# Patient Record
Sex: MALE
[De-identification: names, ages, dates, MRNs, and addresses within clinical notes are randomized per-mention and may not be internally consistent; named-entity substitution may affect disease eponyms.]

---

## 2019-06-17 ENCOUNTER — HOSPITAL ENCOUNTER (INPATIENT)
Dept: HOSPITAL 92 - ERS | Age: 29
LOS: 2 days | Discharge: HOME | DRG: 872 | End: 2019-06-19
Attending: INTERNAL MEDICINE | Admitting: INTERNAL MEDICINE
Payer: SELF-PAY

## 2019-06-17 VITALS — BODY MASS INDEX: 26.3 KG/M2

## 2019-06-17 DIAGNOSIS — E87.2: ICD-10-CM

## 2019-06-17 DIAGNOSIS — A41.50: Primary | ICD-10-CM

## 2019-06-17 DIAGNOSIS — A09: ICD-10-CM

## 2019-06-17 LAB
ALBUMIN SERPL BCG-MCNC: 4.9 G/DL (ref 3.5–5)
ALP SERPL-CCNC: 97 U/L (ref 40–150)
ALT SERPL W P-5'-P-CCNC: 28 U/L (ref 8–55)
ANION GAP SERPL CALC-SCNC: 17 MMOL/L (ref 10–20)
AST SERPL-CCNC: 18 U/L (ref 5–34)
BILIRUB SERPL-MCNC: 0.6 MG/DL (ref 0.2–1.2)
BUN SERPL-MCNC: 12 MG/DL (ref 8.9–20.6)
CALCIUM SERPL-MCNC: 9.9 MG/DL (ref 7.8–10.44)
CHLORIDE SERPL-SCNC: 100 MMOL/L (ref 98–107)
CO2 SERPL-SCNC: 20 MMOL/L (ref 22–29)
CREAT CL PREDICTED SERPL C-G-VRATE: 0 ML/MIN (ref 70–130)
GLOBULIN SER CALC-MCNC: 3.3 G/DL (ref 2.4–3.5)
GLUCOSE SERPL-MCNC: 104 MG/DL (ref 70–105)
HGB BLD-MCNC: 16.8 G/DL (ref 14–18)
MCH RBC QN AUTO: 30.4 PG (ref 27–31)
MCV RBC AUTO: 89.7 FL (ref 78–98)
MDIFF COMPLETE?: YES
PLATELET # BLD AUTO: 266 THOU/UL (ref 130–400)
POLYCHROMASIA BLD QL SMEAR: (no result) (100X)
POTASSIUM SERPL-SCNC: 3.5 MMOL/L (ref 3.5–5.1)
RBC # BLD AUTO: 5.54 MILL/UL (ref 4.7–6.1)
SODIUM SERPL-SCNC: 133 MMOL/L (ref 136–145)
SP GR UR STRIP: 1.05 (ref 1–1.04)
WBC # BLD AUTO: 19.2 THOU/UL (ref 4.8–10.8)

## 2019-06-17 PROCEDURE — 87804 INFLUENZA ASSAY W/OPTIC: CPT

## 2019-06-17 PROCEDURE — 87449 NOS EACH ORGANISM AG IA: CPT

## 2019-06-17 PROCEDURE — 87324 CLOSTRIDIUM AG IA: CPT

## 2019-06-17 PROCEDURE — 87086 URINE CULTURE/COLONY COUNT: CPT

## 2019-06-17 PROCEDURE — 93005 ELECTROCARDIOGRAM TRACING: CPT

## 2019-06-17 PROCEDURE — 94760 N-INVAS EAR/PLS OXIMETRY 1: CPT

## 2019-06-17 PROCEDURE — 85025 COMPLETE CBC W/AUTO DIFF WBC: CPT

## 2019-06-17 PROCEDURE — C9113 INJ PANTOPRAZOLE SODIUM, VIA: HCPCS

## 2019-06-17 PROCEDURE — 87040 BLOOD CULTURE FOR BACTERIA: CPT

## 2019-06-17 PROCEDURE — 80053 COMPREHEN METABOLIC PANEL: CPT

## 2019-06-17 PROCEDURE — 87149 DNA/RNA DIRECT PROBE: CPT

## 2019-06-17 PROCEDURE — 83605 ASSAY OF LACTIC ACID: CPT

## 2019-06-17 PROCEDURE — 71045 X-RAY EXAM CHEST 1 VIEW: CPT

## 2019-06-17 PROCEDURE — 36415 COLL VENOUS BLD VENIPUNCTURE: CPT

## 2019-06-17 PROCEDURE — 74177 CT ABD & PELVIS W/CONTRAST: CPT

## 2019-06-17 PROCEDURE — 81003 URINALYSIS AUTO W/O SCOPE: CPT

## 2019-06-17 RX ADMIN — METRONIDAZOLE SCH MLS: 500 INJECTION, SOLUTION INTRAVENOUS at 21:08

## 2019-06-17 NOTE — RAD
CHEST 1 VIEW:

 

HISTORY: 

Sepsis alert.

 

COMPARISON: 

None.

 

FINDINGS: 

There is hazy opacity of left lung base.  No pneumothorax.  Right lung is clear.  Cardiac silhouette 
and mediastinal contours are within normal limits.  No acute osseous abnormality.

 

IMPRESSION: 

Hazy opacity left lung base concerning for infection.  Followup after treatment recommended.

 

POS: [facility]

## 2019-06-17 NOTE — HP
PRIMARY CARE PHYSICIAN:  The patient currently does not have a primary care

physician. 



CHIEF COMPLAINT:  Abdominal pain.



HISTORY OF PRESENT ILLNESS:  Mr. Nathaniel Fernando is a very pleasant 28-year-old

gentleman who has no known past medical history.  He says last night he had eaten a

hamburger from [employer], and about 2 hours later he started having some sharp

pain in his stomach.  He then started having vomiting as well as diarrhea off and

on.  He had multiple episodes of vomiting and diarrhea, i.e., but no hematemesis, no

blood in the stools.  He did have a high fever according to his girlfriend and the

pain was very severe.  It was sharp in the epigastric area, and at the worst, he

rated it about 10/10.  He has also been having some headache off and on and he says

that he has been having aches in his knees and in his legs off and on.  As a result

of him feeling really bad, he came to the emergency room out of the insistence of

his girlfriend where he was evaluated.  A chest x-ray revealed findings that were

suspicious for possible haziness in the left lower lobe and he also had an elevated

lactic acid as well as elevated white blood cell count, and for this reason, he is

being admitted. 



REVIEW OF SYSTEMS:  CONSTITUTIONAL:  He has had subjective fever, but no chills.  No

night sweats.  No weight loss. 

HEENT:  He has had some headache, but no visual changes.  He has had some mild sore

throat and feeling a bit stopped up.  No rhinorrhea.  No neck pain.  No adenopathy. 

PULMONARY:  He has had cough off and on, but no wheezing.  He has had some mild

dyspnea on exertion. 

CARDIOVASCULAR:  He denies any chest pain.  No shortness of breath.  No PND.  No

orthopnea. 

GASTROINTESTINAL:  Is as in history of present illness.   

GENITOURINARY:  No urinary frequency, hematuria, or hesitancy. NEUROLOGIC:  No focal

weakness or numbness.  No seizures.  PSYCHIATRIC:  No symptoms of anxiety or

depression. 

SKIN AND INTEGUMENT:  No skin changes.  No rash.



PAST MEDICAL HISTORY:  Negative.



PAST SURGICAL HISTORY:  Also negative.



ALLERGIES:  NO KNOWN DRUG ALLERGIES.



SOCIAL HISTORY:  He occasionally drinks once a week socially.  Denies any tobacco

use.  He is single. 



FAMILY HISTORY:  No known history of any medical conditions or heritable diseases.



MEDICATIONS:  None.



PHYSICAL EXAMINATION:

GENERAL:  He is alert and oriented.  He appears to be in no acute distress.  He is

well developed and well nourished. 

VITAL SIGNS:  Blood pressure was 111/65, heart rate 100, respiratory rate of 18,

temperature is 98.8. 

HEENT:  His pupils are equal, round, and reactive to light and accommodation.

Extraocular muscles are intact.  His sclerae anicteric.  Throat; there is no

erythema, no exudates. 

NECK:  No adenopathy.  No bruits. 

LUNGS:  Clear to auscultation.  There is no wheezing, no rales, no rhonchi. 

CARDIOVASCULAR:  He has a normal S1 and S2.  There is no S3 or S4.  No murmurs or

clicks.  No rubs. 

ABDOMEN:  Soft.  He has some diffuse abdominal pain.  There is no rebound, no

guarding, no organomegaly. 

EXTREMITIES:  There is no clubbing or cyanosis.  No edema.  There is no calf

tenderness.  No joint effusions. 

NEUROLOGIC:  Cranial nerves II through XII are grossly intact.  Muscle strength is

5/5 in both his upper and lower extremities. 

SKIN AND INTEGUMENT:  There are no skin changes and no rash.



LAB RESULTS:  The white blood cell count is 19.2, hemoglobin 16.8, hematocrit is

49.7, and platelet count is 226.  Sodium 133, potassium 3.5, chloride is 100, CO2 is

20, BUN of 12, creatinine 1.25, glucose is 104.  Lactic acid 2.5.  Urinalysis was

negative.  On his chest x-ray by my reading, there was some haziness in the left

lower lobe, but it appears to be more of a shadow than it does to be an actual

infiltrate.  There are no effusions and heart size is normal.  He had a CT scan of

the abdomen which was essentially negative. 



ASSESSMENT:  This is a pleasant 28-year-old gentleman who is admitted to the

hospital with abdominal pain, nausea, and vomiting.  I suspect this is likely the

result of gastroenteritis probably and presumed infectious.  He will be admitted due

to the white count and elevated lactic acid, started on IV empiric antibiotics.  We

will follow up with the blood culture results and IV fluids for hydration.  I

suspect that if he is improved by tomorrow and his white blood count is better that

he likely can be discharged home, may be on a short course of oral quinolone and

Flagyl. 



Pneumonia.  I believe this is unlikely and has essentially been ruled out clinically.







Job ID:  [id number]

## 2019-06-17 NOTE — CT
CT ABDOMEN AND PELVIS WITH IV CONTRAST:

6/17/19

 

Multiple axial tomograms obtained through the abdomen and pelvis with IV enhancement. 

 

INDICATIONS:

Abdominal pain. Nausea, vomiting and diarrhea. 

 

FINDINGS: 

Lung bases clear. 

 

Liver, spleen and pancreas unremarkable. 

 

Adrenal glands and kidneys unremarkable. 

 

Small bowel loops appear normal caliber. Appendix appears normal. Colon unremarkable. 

 

IMPRESSION: 

No acute abnormality identified. 

 

POS: OFF

## 2019-06-18 LAB
BASOPHILS # BLD AUTO: 0 THOU/UL (ref 0–0.2)
BASOPHILS NFR BLD AUTO: 0.2 % (ref 0–1)
EOSINOPHIL # BLD AUTO: 0.1 THOU/UL (ref 0–0.7)
EOSINOPHIL NFR BLD AUTO: 0.7 % (ref 0–10)
HGB BLD-MCNC: 14 G/DL (ref 14–18)
LYMPHOCYTES # BLD: 1.2 THOU/UL (ref 1.2–3.4)
LYMPHOCYTES NFR BLD AUTO: 10.6 % (ref 21–51)
MCH RBC QN AUTO: 30.5 PG (ref 27–31)
MCV RBC AUTO: 92.5 FL (ref 78–98)
MONOCYTES # BLD AUTO: 0.7 THOU/UL (ref 0.11–0.59)
MONOCYTES NFR BLD AUTO: 6.5 % (ref 0–10)
NEUTROPHILS # BLD AUTO: 9.1 THOU/UL (ref 1.4–6.5)
NEUTROPHILS NFR BLD AUTO: 82.1 % (ref 42–75)
PLATELET # BLD AUTO: 201 THOU/UL (ref 130–400)
RBC # BLD AUTO: 4.58 MILL/UL (ref 4.7–6.1)
WBC # BLD AUTO: 11.1 THOU/UL (ref 4.8–10.8)

## 2019-06-18 RX ADMIN — METRONIDAZOLE SCH MLS: 500 INJECTION, SOLUTION INTRAVENOUS at 14:03

## 2019-06-18 RX ADMIN — METRONIDAZOLE SCH MLS: 500 INJECTION, SOLUTION INTRAVENOUS at 05:08

## 2019-06-18 RX ADMIN — METRONIDAZOLE SCH MLS: 500 INJECTION, SOLUTION INTRAVENOUS at 21:13

## 2019-06-18 NOTE — PDOC.PN
- Subjective


Encounter Start Date: 06/18/19


Encounter Start Time: 10:20


-: old records requested/rev





pt has diarrhoea and abdominal cramps, no vomiting, no fever


Patient seen and examined. No new complaints. No overnight events





- Objective


Resuscitation Status - Order Detail:





06/17/19 17:34


Resuscitation Status Routine 


   Resuscitation Status: FULL: Full Resuscitation








MAR Reviewed: Yes


Vital Signs & Weight: 


 Vital Signs (12 hours)











  Temp Pulse Resp BP BP Pulse Ox


 


 06/18/19 08:00  97.6 F  73  16  110/69   96


 


 06/18/19 04:00  97.6 F  59 L  16  107/66   97


 


 06/18/19 00:00  97.5 F L  65  16   117/69  97








 Weight











Weight                         173 lb














I&O: 


 











 06/17/19 06/18/19 06/19/19





 06:59 06:59 06:59


 


Intake Total  2064 


 


Balance  2064 











Result Diagrams: 


 06/18/19 04:09





 06/17/19 11:11


Radiology Reviewed by me: Yes





Phys Exam





- Physical Examination


Constitutional: NAD


HEENT: PERRLA, moist MMs, sclera anicteric


Neck: no JVD, supple


Respiratory: no wheezing, no rales, no rhonchi


Cardiovascular: RRR, no significant murmur, no rub


Gastrointestinal: soft, non-tender, no distention, positive bowel sounds


Musculoskeletal: no edema, pulses present


Neurological: non-focal, normal sensation, moves all 4 limbs


Lymphatic: no nodes


Psychiatric: normal affect, A&O x 3


Skin: no rash, normal turgor, cap refill <2 seconds





Dx/Plan


(1) Gastroenteritis, infectious, presumed


Code(s): K52.9 - NONINFECTIVE GASTROENTERITIS AND COLITIS, UNSPECIFIED   Status

: Acute   





(2) Lactic acidosis


Code(s): E87.2 - ACIDOSIS   Status: Acute   





(3) Sepsis


Code(s): A41.9 - SEPSIS, UNSPECIFIED ORGANISM   Status: Acute   





- Plan


cont current plan of care, plan discussed w/ family, continue antibiotics





* medication reviewed as below


* symptomatic treatment


* continue IVF


* continue levaquin and flagyl


* discussed with family


* expecting discharge soon.








Review of Systems





- Review of Systems


ENT: negative: Ear Pain, Ear Discharge, Nose Pain, Nose Discharge, Nose 

Congestion, Mouth Pain, Mouth Swelling, Throat Pain, Throat Swelling, Other


Respiratory: negative: Cough, Dry, Shortness of Breath, Hemoptysis, SOB with 

Excertion, Pleuritic Pain, Sputum, Wheezing


Cardiovascular: negative: chest pain, palpitations, orthopnea, paroxysmal 

nocturnal dyspnea, edema, light headedness, other


Gastrointestinal: Diarrhea.  negative: Nausea, Vomiting, Abdominal Pain, 

Constipation, Melena, Hematochezia, Other


Genitourinary: negative: Dysuria, Frequency, Incontinence, Hematuria, Retention

, Other


Musculoskeletal: negative: Neck Pain, Shoulder Pain, Arm Pain, Back Pain, Hand 

Pain, Leg Pain, Foot Pain, Other


Skin: negative: Rash, Lesions, João, Bruising, Other





- Medications/Allergies


Allergies/Adverse Reactions: 


 Allergies











Allergy/AdvReac Type Severity Reaction Status Date / Time


 


No Known Drug Allergies Allergy   Verified 06/17/19 16:21











Medications: 


 Current Medications





Acetaminophen (Tylenol)  650 mg PO Q4H PRN


   PRN Reason: Headache/Fever/Mild Pain (1-3)


   Last Admin: 06/17/19 18:06 Dose:  650 mg


Acetaminophen (Tylenol)  650 mg VT Q4H PRN


   PRN Reason: Headache/Fever/Mild Pain (1-3)


Artificial Tears (Tears Naturale)  2 drop EA EYE PRN PRN


   PRN Reason: Dry Eyes


Enoxaparin Sodium (Lovenox)  40 mg SC 0900 [facility]


   Last Admin: 06/18/19 08:45 Dose:  40 mg


Famotidine (Pepcid)  20 mg PO BID [facility]


   Last Admin: 06/18/19 08:44 Dose:  20 mg


Guaifenesin (Robitussin Sf)  200 mg PO Q4H PRN


   PRN Reason: Cough


Hydralazine HCl (Apresoline)  10 mg SLOW IVP Q4H PRN


   PRN Reason: SBP > 180 and HR < 70


Levofloxacin 500 mg/ Device  100 mls @ 100 mls/hr IVPB ONCALL-OR CASS


Metronidazole 500 mg/ Device  100 mls @ 100 mls/hr IVPB Q8HR [facility]


   Last Admin: 06/18/19 05:08 Dose:  100 mls


Sodium Chloride (Normal Saline 0.9%)  1,000 mls @ 100 mls/hr IV .Q10H [facility]


   Last Admin: 06/18/19 04:06 Dose:  1,000 mls


Ibuprofen (Motrin)  800 mg PO Q8HR PRN


   PRN Reason: Pain


   Last Admin: 06/17/19 18:45 Dose:  800 mg


Loperamide HCl (Imodium)  2 mg PO PRN PRN


   PRN Reason: Diarrhea/Loose Stools


   Last Admin: 06/18/19 08:48 Dose:  2 mg


Loratadine (Claritin)  10 mg PO DAILYPRN PRN


   PRN Reason: Sinus Symptoms


Ondansetron HCl (Zofran)  4 mg IVP Q6H PRN


   PRN Reason: Nausea/Vomiting


Promethazine HCl (Phenergan)  25 mg PO Q6H PRN


   PRN Reason: Nausea/Vomiting


Promethazine HCl (Phenergan)  25 mg IM/IV Q6H PRN


   PRN Reason: Nausea/Vomiting


Sodium Chloride (Flush - Normal Saline)  10 ml IVF Q12HR PRN


   PRN Reason: Saline Flush


Sodium Chloride (Flush - Normal Saline)  10 ml IVF PRN PRN


   PRN Reason: Saline Flush


Sodium Chloride (Ocean Nasal Spray 0.65%)  0 ml EA NARE QIDPRN PRN


   PRN Reason: Nasal Congestion


Temazepam (Restoril)  15 mg PO HSPRN PRN


   PRN Reason: Insomnia


Throat Lozenges (Cepastat Lozenges)  1 nohemi PO Q2H PRN


   PRN Reason: Sore Throat

## 2019-06-19 VITALS — DIASTOLIC BLOOD PRESSURE: 78 MMHG | TEMPERATURE: 98.1 F | SYSTOLIC BLOOD PRESSURE: 119 MMHG

## 2019-06-19 RX ADMIN — METRONIDAZOLE SCH MLS: 500 INJECTION, SOLUTION INTRAVENOUS at 05:00

## 2019-06-19 NOTE — DIS
DATE OF ADMISSION:  06/17/2019



DATE OF DISCHARGE:  06/19/2019



DISCHARGE DISPOSITION:  Home.



PRIMARY DISCHARGE DIAGNOSES:  

1. Gastroenteritis, presumed infectious.

2. Bacteremia due to gram-negative alonzo.

3. Lactic acidosis, improved.

4. Sepsis, resolved.



SECONDARY DISCHARGE DIAGNOSIS:  None.



PRIMARY PROCEDURE/OPERATION:  None.



RADIOLOGICAL INVESTIGATION:  Abdomen and pelvis CT scan was unremarkable.  Chest

x-ray normal. 



SIGNIFICANT LABORATORY DATA:  WBC 11.1, hemoglobin 14.0, platelet 201.  Sodium 133,

potassium 3.5, BUN 12, creatinine 1.25.  Lactic acid 1.7.  LFT normal.  Urinalysis

normal.  Stool for infection workup with C. diff came back negative.  Urine culture

negative.  Influenza screen negative.  Blood culture after discharge reported as

gram-negative alonzo. 



DISCHARGE MEDICATIONS:  

1. Ciprofloxacin 500 mg p.o. b.i.d. for 15 days.

2. Florastor 250 mg p.o. daily for 15 days.

3. Bentyl 10 mg q.i.d. p.r.n.



CONTRAINDICATION:  None.



CODE STATUS:  Full code.



INPATIENT CONSULTANT:  None.



ALLERGIES:  NO KNOWN DRUG ALLERGIES.



DISCHARGE PLAN:  Posthospital, the patient is instructed to follow up with primary

care physician in 1 week. 



HOSPITAL COURSE:  A 28-year-old  male, who was admitted by Dr. Malik.

Please see her H and P for further details.  The patient was having nausea,

vomiting, diarrhea.  He had mild lactic acidosis on admission.  He was also having

crampy abdominal pain.  In the emergency room, CT abdomen and pelvis was done.

Contraindication, none.  Code status, full code.  Inpatient consultant, none.

Allergy, no known drug allergy.  Discharge plan, posthospital, the patient will

follow up with primary care physician in 1 week. 



HOSPITAL COURSE:  A 28-year-old male, who was admitted by Dr. Malik.  Please see

her H and P for further details.  The patient was admitted for nausea, vomiting,

abdominal pain, and diarrhea.  He had presumed gastroenteritis from infectious

etiology.  His stool for C. diff came back negative.  CT abdomen and pelvis was

unremarkable.  Chest x-ray was unremarkable.  His urinalysis was normal.  He was

treated with Levaquin and Flagyl while in hospital.  The patient was subjectively

doing great and he was not having any further diarrhea or any symptoms.  He was

afebrile and he expressed his wish to go home.  After going home, we just noted that

his blood culture is positive for gram-negative alonzo.  We tried to call his listed

number and left voicemail as to whether change his prescription to pharmacy, and I

spoke with the pharmacy and I increased the duration of ciprofloxacin to 15 days.

We will keep checking his culture and sensitivity result.  If culture and

sensitivity result is not responsive to ciprofloxacin, then we will change

appropriate antibiotic therapy, but clinically, he has responded to levofloxacin

while in hospital and he does not need any Flagyl so that medication was

discontinued.  He will have Bentyl p.r.n. basis for abdominal cramps. 



I have seen and examined the patient at bedside today.



PHYSICAL EXAMINATION:

VITAL SIGNS:  Currently, temperature 98.1, pulse 61, respiratory rate 16, saturation

96% on room air, blood pressure 119/78, weight 173 pounds. 

GENERAL:  The patient is currently alert and awake, in no obvious acute distress. 

HEAD:  Normocephalic and atraumatic. 

LUNGS:  Clear to auscultation without any rhonchi or rales. 

CARDIAC:  S1 and S2.  Regular without any murmur. 

ABDOMEN:  Soft and benign. 

EXTREMITIES:  No edema. 

NEUROLOGIC:  Nonfocal examination. 



Overall, the patient is medically stable for discharge.  We will keep checking his

blood culture result, and if anything, then we will let the patient's family member

or the patient know. 







Job ID:  [id number]